# Patient Record
Sex: FEMALE | Race: WHITE | NOT HISPANIC OR LATINO | URBAN - METROPOLITAN AREA
[De-identification: names, ages, dates, MRNs, and addresses within clinical notes are randomized per-mention and may not be internally consistent; named-entity substitution may affect disease eponyms.]

---

## 2023-03-06 PROBLEM — M79.671 FOOT PAIN, BILATERAL: Status: ACTIVE | Noted: 2023-03-06

## 2023-03-06 PROBLEM — M99.06 SEGMENTAL AND SOMATIC DYSFUNCTION OF LOWER EXTREMITY: Status: ACTIVE | Noted: 2023-03-06

## 2023-03-06 PROBLEM — M25.551 RIGHT HIP PAIN: Status: ACTIVE | Noted: 2023-03-06

## 2023-03-06 PROBLEM — Q66.89 CLAW TOE: Status: ACTIVE | Noted: 2023-03-06

## 2023-03-06 PROBLEM — H52.10 MYOPIA: Status: ACTIVE | Noted: 2023-03-06

## 2023-03-06 PROBLEM — H52.10 MYOPIA WITH PRESBYOPIA: Status: ACTIVE | Noted: 2023-03-06

## 2023-03-06 PROBLEM — H52.209 ASTIGMATISM: Status: ACTIVE | Noted: 2023-03-06

## 2023-03-06 PROBLEM — S76.011A STRAIN OF FLEXOR MUSCLE OF RIGHT HIP: Status: ACTIVE | Noted: 2023-03-06

## 2023-03-06 PROBLEM — J45.901 ASTHMATIC BRONCHITIS WITH EXACERBATION (HHS-HCC): Status: ACTIVE | Noted: 2023-03-06

## 2023-03-06 PROBLEM — M77.42 METATARSALGIA OF LEFT FOOT: Status: ACTIVE | Noted: 2023-03-06

## 2023-03-06 PROBLEM — M06.9 RHEUMATOID ARTHRITIS (MULTI): Status: ACTIVE | Noted: 2023-03-06

## 2023-03-06 PROBLEM — M20.10 HALLUX VALGUS, ACQUIRED: Status: ACTIVE | Noted: 2023-03-06

## 2023-03-06 PROBLEM — M67.01 SHORT ACHILLES TENDON OF RIGHT LOWER EXTREMITY: Status: ACTIVE | Noted: 2023-03-06

## 2023-03-06 PROBLEM — M99.05 PELVIC REGION SOMATIC DYSFUNCTION: Status: ACTIVE | Noted: 2023-03-06

## 2023-03-06 PROBLEM — G47.9 SLEEP DISTURBANCE: Status: ACTIVE | Noted: 2023-03-06

## 2023-03-06 PROBLEM — M99.09 SEGMENTAL AND SOMATIC DYSFUNCTION: Status: ACTIVE | Noted: 2023-03-06
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
## 2023-03-06 PROBLEM — R00.2 PALPITATIONS: Status: ACTIVE | Noted: 2023-03-06

## 2023-03-06 PROBLEM — F32.A DEPRESSION: Status: ACTIVE | Noted: 2023-03-06

## 2023-03-06 PROBLEM — M54.50 LEFT-SIDED LOW BACK PAIN WITHOUT SCIATICA: Status: ACTIVE | Noted: 2023-03-06

## 2023-03-06 PROBLEM — H33.323 PERIPHERAL RETINAL HOLE OF BOTH EYES: Status: ACTIVE | Noted: 2023-03-06

## 2023-03-06 PROBLEM — M99.04 SEGMENTAL AND SOMATIC DYSFUNCTION OF SACRAL REGION: Status: ACTIVE | Noted: 2023-03-06

## 2023-03-06 PROBLEM — M99.02 SEGMENTAL AND SOMATIC DYSFUNCTION OF THORACIC REGION: Status: ACTIVE | Noted: 2023-03-06

## 2023-03-06 PROBLEM — M67.02 SHORT ACHILLES TENDON OF LEFT LOWER EXTREMITY: Status: RESOLVED | Noted: 2023-03-06 | Resolved: 2023-03-06

## 2023-03-06 PROBLEM — B35.3 TINEA PEDIS OF BOTH FEET: Status: ACTIVE | Noted: 2023-03-06

## 2023-03-06 PROBLEM — M25.552 LEFT HIP PAIN: Status: ACTIVE | Noted: 2023-03-06

## 2023-03-06 PROBLEM — M99.03 LUMBAR REGION SOMATIC DYSFUNCTION: Status: ACTIVE | Noted: 2023-03-06

## 2023-03-06 PROBLEM — H43.813 PVD (POSTERIOR VITREOUS DETACHMENT), BOTH EYES: Status: ACTIVE | Noted: 2023-03-06

## 2023-03-06 PROBLEM — R92.8 ABNORMAL FINDING ON MAMMOGRAPHY: Status: ACTIVE | Noted: 2023-03-06

## 2023-03-06 PROBLEM — R53.81 PHYSICAL DECONDITIONING: Status: ACTIVE | Noted: 2023-03-06

## 2023-03-06 PROBLEM — M77.41 METATARSALGIA OF RIGHT FOOT: Status: ACTIVE | Noted: 2023-03-06

## 2023-03-06 PROBLEM — H91.90 HEARING LOSS: Status: ACTIVE | Noted: 2023-03-06

## 2023-03-06 PROBLEM — M76.822 TIBIALIS POSTERIOR TENDONITIS, LEFT: Status: ACTIVE | Noted: 2023-03-06

## 2023-03-06 PROBLEM — R26.89 IMBALANCE: Status: ACTIVE | Noted: 2023-03-06

## 2023-03-06 PROBLEM — M79.672 FOOT PAIN, BILATERAL: Status: ACTIVE | Noted: 2023-03-06

## 2023-03-06 PROBLEM — N60.99 ATYPICAL DUCTAL HYPERPLASIA, BREAST: Status: ACTIVE | Noted: 2023-03-06

## 2023-03-06 PROBLEM — H52.4 MYOPIA WITH PRESBYOPIA: Status: ACTIVE | Noted: 2023-03-06

## 2023-03-06 PROBLEM — M76.821 TIBIALIS POSTERIOR TENDINITIS, RIGHT: Status: ACTIVE | Noted: 2023-03-06

## 2023-03-06 PROBLEM — R91.8 LUNG NODULES: Status: ACTIVE | Noted: 2023-03-06

## 2023-03-06 PROBLEM — H35.371 EPIRETINAL MEMBRANE (ERM) OF RIGHT EYE: Status: ACTIVE | Noted: 2023-03-06

## 2023-03-06 PROBLEM — M99.08 RIB CAGE REGION SOMATIC DYSFUNCTION: Status: ACTIVE | Noted: 2023-03-06

## 2023-03-06 PROBLEM — M20.009 FINGER DEFORMITY: Status: ACTIVE | Noted: 2023-03-06

## 2023-03-06 PROBLEM — M81.0 OSTEOPOROSIS: Status: ACTIVE | Noted: 2023-03-06

## 2023-03-06 PROBLEM — R79.89 LOW SERUM SODIUM: Status: ACTIVE | Noted: 2023-03-06

## 2023-03-06 PROBLEM — H25.11 AGE-RELATED NUCLEAR CATARACT, RIGHT: Status: ACTIVE | Noted: 2023-03-06

## 2023-03-06 PROBLEM — H25.813 COMBINED FORM OF AGE-RELATED CATARACT, BOTH EYES: Status: ACTIVE | Noted: 2023-03-06

## 2023-03-06 PROBLEM — D12.6 ADENOMATOUS COLON POLYP: Status: ACTIVE | Noted: 2023-03-06

## 2023-03-06 PROBLEM — I10 BENIGN HYPERTENSION: Status: ACTIVE | Noted: 2023-03-06

## 2023-03-06 PROBLEM — R29.898 MUSCULAR DECONDITIONING: Status: ACTIVE | Noted: 2023-03-06

## 2023-03-06 PROBLEM — H25.12 AGE-RELATED NUCLEAR CATARACT, LEFT: Status: ACTIVE | Noted: 2023-03-06

## 2023-03-06 RX ORDER — HYDROXYCHLOROQUINE SULFATE 200 MG/1
200 TABLET, FILM COATED ORAL
COMMUNITY
Start: 2014-01-15 | End: 2024-04-25 | Stop reason: SDUPTHER

## 2023-03-06 RX ORDER — FLUOXETINE HYDROCHLORIDE 20 MG/1
1 CAPSULE ORAL DAILY
COMMUNITY
Start: 2016-03-09 | End: 2023-04-07

## 2023-03-06 RX ORDER — CHOLECALCIFEROL (VITAMIN D3) 25 MCG
1 TABLET ORAL DAILY
COMMUNITY
Start: 2014-01-15

## 2023-03-06 RX ORDER — TRAZODONE HYDROCHLORIDE 50 MG/1
1 TABLET ORAL NIGHTLY PRN
COMMUNITY
Start: 2014-01-15 | End: 2023-04-07

## 2023-03-06 RX ORDER — METHOTREXATE 2.5 MG/1
TABLET ORAL
COMMUNITY
Start: 2022-05-26 | End: 2024-04-25 | Stop reason: SDUPTHER

## 2023-03-06 RX ORDER — CALCIUM CARBONATE 600 MG
1 TABLET ORAL DAILY
COMMUNITY
Start: 2015-04-29

## 2023-03-06 RX ORDER — ZOLEDRONIC ACID 5 MG/100ML
5 INJECTION, SOLUTION INTRAVENOUS
COMMUNITY
Start: 2022-06-24

## 2023-03-06 RX ORDER — FOLIC ACID 1 MG/1
1 TABLET ORAL DAILY
COMMUNITY
Start: 2014-01-15 | End: 2024-03-15

## 2023-03-13 ENCOUNTER — OFFICE VISIT (OUTPATIENT)
Dept: PRIMARY CARE | Facility: CLINIC | Age: 68
End: 2023-03-13
Payer: MEDICARE

## 2023-03-13 VITALS — HEIGHT: 68 IN | WEIGHT: 134.4 LBS | BODY MASS INDEX: 20.37 KG/M2

## 2023-03-13 DIAGNOSIS — I10 BENIGN HYPERTENSION: ICD-10-CM

## 2023-03-13 DIAGNOSIS — Q66.89 CLAW TOE: ICD-10-CM

## 2023-03-13 DIAGNOSIS — M79.672 FOOT PAIN, BILATERAL: Primary | ICD-10-CM

## 2023-03-13 DIAGNOSIS — M79.671 FOOT PAIN, BILATERAL: Primary | ICD-10-CM

## 2023-03-13 DIAGNOSIS — M05.741 RHEUMATOID ARTHRITIS INVOLVING BOTH HANDS WITH POSITIVE RHEUMATOID FACTOR (MULTI): ICD-10-CM

## 2023-03-13 DIAGNOSIS — M05.742 RHEUMATOID ARTHRITIS INVOLVING BOTH HANDS WITH POSITIVE RHEUMATOID FACTOR (MULTI): ICD-10-CM

## 2023-03-13 DIAGNOSIS — M85.88 OSTEOPENIA OF LUMBAR SPINE: ICD-10-CM

## 2023-03-13 PROCEDURE — 99213 OFFICE O/P EST LOW 20 MIN: CPT | Performed by: INTERNAL MEDICINE

## 2023-03-13 NOTE — PROGRESS NOTES
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       1. RA   -Plaquenil, MTX   -h/o subluxed finger  -FU rheum     2. HTN, h/o palpitatons   -Maxzide full tab daily in the past c/b dizziness   -stress test 12.2019 WNL     3. Osteoporosis   -Reclast since 2018 - last 7.22    4. MDD   -Prozac 20   -Trazo 50     5. Leg wuond in 2017, chronic venous stasis, mild     6. Atypical ductal hyperplasia     7. Bunions, foot pain - saw podiatry (Robbin) 4/22, given orthotics     8. Pulm nodule, never smoker   -last CT chest 2015     10. LCIS s/p rxn     #HM   [ ]due screen labs   -dexa 9.2021   -mammo 12.16.22  -cscope 7.2021 ([facility]) - 7 years  -cac zero in 2013   -shingrix x2   -tdap 2016           3/23/2022     1:14 PM 3/30/2022     2:26 PM 4/20/2022     9:37 AM 7/21/2022     8:09 AM 7/21/2022     8:45 AM 9/20/2022     3:09 PM 3/13/2023     9:08 AM   Vitals   Systolic 163  153 134  117    Diastolic 97  67 81  79    Heart Rate 76  85 76  87    Temp 35.7 °C (96.3 °F)  36.1 °C (96.9 °F) 36.2 °C (97.1 °F) 36.2 °C (97.2 °F)     Resp    18      Height (in)  1.72 m (5' 7.72\") 1.733 m (5' 8.23\")   1.727 m (5' 8\") 1.727 m (5' 8\")   Weight (lb)  128 128.97 130.84  129 134.4   BMI  19.63 kg/m2 19.48 kg/m2 19.76 kg/m2  19.61 kg/m2 20.44 kg/m2   BSA (m2)  1.67 m2 1.68 m2 1.69 m2  1.68 m2 1.71 m2   Visit Report       Report    /80 today   HR 67     Gen Aox3 NAD well appearing   HEENT mmm pharynx clear no cervical LAD   Eyes sclerae clear   CV rrr nl s1/2 no m/r/g  Pulm CTAB no adventitious sounds   Ext warm dry no edema 2+ DP pulse       A/P   -screening labs today   -RTO 1 year  "

## 2023-03-24 ENCOUNTER — LAB (OUTPATIENT)
Dept: LAB | Facility: LAB | Age: 68
End: 2023-03-24
Payer: MEDICARE

## 2023-03-24 DIAGNOSIS — M05.741 RHEUMATOID ARTHRITIS INVOLVING BOTH HANDS WITH POSITIVE RHEUMATOID FACTOR (MULTI): ICD-10-CM

## 2023-03-24 DIAGNOSIS — I10 BENIGN HYPERTENSION: ICD-10-CM

## 2023-03-24 DIAGNOSIS — M85.88 OSTEOPENIA OF LUMBAR SPINE: ICD-10-CM

## 2023-03-24 DIAGNOSIS — M05.742 RHEUMATOID ARTHRITIS INVOLVING BOTH HANDS WITH POSITIVE RHEUMATOID FACTOR (MULTI): ICD-10-CM

## 2023-03-24 LAB
BASOPHILS (10*3/UL) IN BLOOD BY AUTOMATED COUNT: 0.02 X10E9/L (ref 0–0.1)
BASOPHILS/100 LEUKOCYTES IN BLOOD BY AUTOMATED COUNT: 0.2 % (ref 0–2)
EOSINOPHILS (10*3/UL) IN BLOOD BY AUTOMATED COUNT: 0.12 X10E9/L (ref 0–0.7)
EOSINOPHILS/100 LEUKOCYTES IN BLOOD BY AUTOMATED COUNT: 1.5 % (ref 0–6)
ERYTHROCYTE DISTRIBUTION WIDTH (RATIO) BY AUTOMATED COUNT: 12.9 % (ref 11.5–14.5)
ERYTHROCYTE MEAN CORPUSCULAR HEMOGLOBIN CONCENTRATION (G/DL) BY AUTOMATED: 33.2 G/DL (ref 32–36)
ERYTHROCYTE MEAN CORPUSCULAR VOLUME (FL) BY AUTOMATED COUNT: 99 FL (ref 80–100)
ERYTHROCYTES (10*6/UL) IN BLOOD BY AUTOMATED COUNT: 3.85 X10E12/L (ref 4–5.2)
HEMATOCRIT (%) IN BLOOD BY AUTOMATED COUNT: 38 % (ref 36–46)
HEMOGLOBIN (G/DL) IN BLOOD: 12.6 G/DL (ref 12–16)
IMMATURE GRANULOCYTES/100 LEUKOCYTES IN BLOOD BY AUTOMATED COUNT: 0.2 % (ref 0–0.9)
LEUKOCYTES (10*3/UL) IN BLOOD BY AUTOMATED COUNT: 8.2 X10E9/L (ref 4.4–11.3)
LYMPHOCYTES (10*3/UL) IN BLOOD BY AUTOMATED COUNT: 2.32 X10E9/L (ref 1.2–4.8)
LYMPHOCYTES/100 LEUKOCYTES IN BLOOD BY AUTOMATED COUNT: 28.4 % (ref 13–44)
MONOCYTES (10*3/UL) IN BLOOD BY AUTOMATED COUNT: 0.46 X10E9/L (ref 0.1–1)
MONOCYTES/100 LEUKOCYTES IN BLOOD BY AUTOMATED COUNT: 5.6 % (ref 2–10)
NEUTROPHILS (10*3/UL) IN BLOOD BY AUTOMATED COUNT: 5.24 X10E9/L (ref 1.2–7.7)
NEUTROPHILS/100 LEUKOCYTES IN BLOOD BY AUTOMATED COUNT: 64.1 % (ref 40–80)
NRBC (PER 100 WBCS) BY AUTOMATED COUNT: 0 /100 WBC (ref 0–0)
PLATELETS (10*3/UL) IN BLOOD AUTOMATED COUNT: 242 X10E9/L (ref 150–450)

## 2023-03-24 PROCEDURE — 36415 COLL VENOUS BLD VENIPUNCTURE: CPT

## 2023-03-24 PROCEDURE — 84443 ASSAY THYROID STIM HORMONE: CPT

## 2023-03-24 PROCEDURE — 80061 LIPID PANEL: CPT

## 2023-03-24 PROCEDURE — 85025 COMPLETE CBC W/AUTO DIFF WBC: CPT

## 2023-03-24 PROCEDURE — 82306 VITAMIN D 25 HYDROXY: CPT

## 2023-03-24 PROCEDURE — 80053 COMPREHEN METABOLIC PANEL: CPT

## 2023-03-25 LAB
ALANINE AMINOTRANSFERASE (SGPT) (U/L) IN SER/PLAS: 23 U/L (ref 7–45)
ALBUMIN (G/DL) IN SER/PLAS: 4.4 G/DL (ref 3.4–5)
ALKALINE PHOSPHATASE (U/L) IN SER/PLAS: 52 U/L (ref 33–136)
ANION GAP IN SER/PLAS: 13 MMOL/L (ref 10–20)
ASPARTATE AMINOTRANSFERASE (SGOT) (U/L) IN SER/PLAS: 27 U/L (ref 9–39)
BILIRUBIN TOTAL (MG/DL) IN SER/PLAS: 0.6 MG/DL (ref 0–1.2)
CALCIDIOL (25 OH VITAMIN D3) (NG/ML) IN SER/PLAS: 64 NG/ML
CALCIUM (MG/DL) IN SER/PLAS: 9.7 MG/DL (ref 8.6–10.6)
CARBON DIOXIDE, TOTAL (MMOL/L) IN SER/PLAS: 29 MMOL/L (ref 21–32)
CHLORIDE (MMOL/L) IN SER/PLAS: 97 MMOL/L (ref 98–107)
CHOLESTEROL (MG/DL) IN SER/PLAS: 204 MG/DL (ref 0–199)
CHOLESTEROL IN HDL (MG/DL) IN SER/PLAS: 80.7 MG/DL
CHOLESTEROL/HDL RATIO: 2.5
CREATININE (MG/DL) IN SER/PLAS: 0.73 MG/DL (ref 0.5–1.05)
GFR FEMALE: 90 ML/MIN/1.73M2
GLUCOSE (MG/DL) IN SER/PLAS: 93 MG/DL (ref 74–99)
LDL: 98 MG/DL (ref 0–99)
POTASSIUM (MMOL/L) IN SER/PLAS: 4.7 MMOL/L (ref 3.5–5.3)
PROTEIN TOTAL: 6.7 G/DL (ref 6.4–8.2)
SODIUM (MMOL/L) IN SER/PLAS: 134 MMOL/L (ref 136–145)
THYROTROPIN (MIU/L) IN SER/PLAS BY DETECTION LIMIT <= 0.05 MIU/L: 1.52 MIU/L (ref 0.44–3.98)
TRIGLYCERIDE (MG/DL) IN SER/PLAS: 126 MG/DL (ref 0–149)
UREA NITROGEN (MG/DL) IN SER/PLAS: 13 MG/DL (ref 6–23)
VLDL: 25 MG/DL (ref 0–40)

## 2023-04-07 DIAGNOSIS — G47.9 SLEEP DISTURBANCE: Primary | ICD-10-CM

## 2023-04-07 RX ORDER — FLUOXETINE HYDROCHLORIDE 20 MG/1
CAPSULE ORAL
Qty: 90 CAPSULE | Refills: 1 | Status: SHIPPED | OUTPATIENT
Start: 2023-04-07 | End: 2023-10-04

## 2023-04-07 RX ORDER — TRAZODONE HYDROCHLORIDE 50 MG/1
TABLET ORAL
Qty: 90 TABLET | Refills: 1 | Status: SHIPPED | OUTPATIENT
Start: 2023-04-07 | End: 2023-10-04

## 2023-09-26 ENCOUNTER — APPOINTMENT (OUTPATIENT)
Dept: PRIMARY CARE | Facility: CLINIC | Age: 68
End: 2023-09-26
Payer: MEDICARE

## 2023-09-27 ENCOUNTER — APPOINTMENT (OUTPATIENT)
Dept: PRIMARY CARE | Facility: CLINIC | Age: 68
End: 2023-09-27
Payer: MEDICARE

## 2023-10-04 DIAGNOSIS — G47.9 SLEEP DISTURBANCE: ICD-10-CM

## 2023-10-04 RX ORDER — TRAZODONE HYDROCHLORIDE 50 MG/1
TABLET ORAL
Qty: 90 TABLET | Refills: 3 | Status: SHIPPED | OUTPATIENT
Start: 2023-10-04 | End: 2024-02-16 | Stop reason: SDUPTHER

## 2023-10-04 RX ORDER — FLUOXETINE HYDROCHLORIDE 20 MG/1
CAPSULE ORAL
Qty: 90 CAPSULE | Refills: 3 | Status: SHIPPED | OUTPATIENT
Start: 2023-10-04 | End: 2024-02-16 | Stop reason: SDUPTHER

## 2023-10-18 ENCOUNTER — OFFICE VISIT (OUTPATIENT)
Dept: PRIMARY CARE | Facility: CLINIC | Age: 68
End: 2023-10-18
Payer: MEDICARE

## 2023-10-18 VITALS
DIASTOLIC BLOOD PRESSURE: 81 MMHG | SYSTOLIC BLOOD PRESSURE: 141 MMHG | WEIGHT: 133.5 LBS | HEART RATE: 65 BPM | BODY MASS INDEX: 20.3 KG/M2

## 2023-10-18 DIAGNOSIS — Z78.0 ASYMPTOMATIC POSTMENOPAUSAL STATE: ICD-10-CM

## 2023-10-18 DIAGNOSIS — Z12.31 BREAST CANCER SCREENING BY MAMMOGRAM: ICD-10-CM

## 2023-10-18 DIAGNOSIS — Z12.39 ENCOUNTER FOR SCREENING FOR MALIGNANT NEOPLASM OF BREAST, UNSPECIFIED SCREENING MODALITY: Primary | ICD-10-CM

## 2023-10-18 DIAGNOSIS — I10 BENIGN ESSENTIAL HYPERTENSION: ICD-10-CM

## 2023-10-18 PROCEDURE — 1170F FXNL STATUS ASSESSED: CPT | Performed by: INTERNAL MEDICINE

## 2023-10-18 PROCEDURE — 99214 OFFICE O/P EST MOD 30 MIN: CPT | Performed by: INTERNAL MEDICINE

## 2023-10-18 PROCEDURE — 3079F DIAST BP 80-89 MM HG: CPT | Performed by: INTERNAL MEDICINE

## 2023-10-18 PROCEDURE — G0439 PPPS, SUBSEQ VISIT: HCPCS | Performed by: INTERNAL MEDICINE

## 2023-10-18 PROCEDURE — 1126F AMNT PAIN NOTED NONE PRSNT: CPT | Performed by: INTERNAL MEDICINE

## 2023-10-18 PROCEDURE — 3077F SYST BP >= 140 MM HG: CPT | Performed by: INTERNAL MEDICINE

## 2023-10-18 RX ORDER — LISINOPRIL 10 MG/1
10 TABLET ORAL DAILY
Qty: 30 TABLET | Refills: 0 | Status: SHIPPED
Start: 2023-10-18 | End: 2024-02-16 | Stop reason: DRUGHIGH

## 2023-10-18 ASSESSMENT — ACTIVITIES OF DAILY LIVING (ADL)
TAKING_MEDICATION: INDEPENDENT
GROCERY_SHOPPING: INDEPENDENT
DOING_HOUSEWORK: INDEPENDENT
DRESSING: INDEPENDENT
BATHING: INDEPENDENT
MANAGING_FINANCES: INDEPENDENT

## 2023-10-18 ASSESSMENT — ENCOUNTER SYMPTOMS
DEPRESSION: 0
OCCASIONAL FEELINGS OF UNSTEADINESS: 0
LOSS OF SENSATION IN FEET: 0

## 2023-10-18 ASSESSMENT — PATIENT HEALTH QUESTIONNAIRE - PHQ9
2. FEELING DOWN, DEPRESSED OR HOPELESS: NOT AT ALL
1. LITTLE INTEREST OR PLEASURE IN DOING THINGS: NOT AT ALL
SUM OF ALL RESPONSES TO PHQ9 QUESTIONS 1 AND 2: 0

## 2023-10-18 NOTE — PATIENT INSTRUCTIONS
Sunita,     Call [phone] to schedule mammogram 12-16-23 or later and a bone density anytime!   Start lisin 10 1/2 tab at bedtime can increase to full - let me know home Bps in a few weeks and go to any  lab in 2 weeks or so for repeat BMP.   Full labs due in 3.2024 and I will order.  Message me via [employer] anytime.     CL

## 2023-10-18 NOTE — PROGRESS NOTES
Sunita presents for [facility] wellness.          1. RA   -Plaquenil, MTX   -h/o subluxed finger  -FU rheum      2. HTN, h/o palpitatons - off meds with h/o Maxzide only in the past c/b dizziness   -some elev home Bps      3. Osteoporosis   -Reclast since 2018 - last 7.22     4. MDD   -Prozac 20   -Trazo 50      5. Leg wuond in 2017, chronic venous stasis, mild      6. Atypical ductal hyperplasia      7. Bunions, foot pain - saw podiatry (Robbin) 4/22, given orthotics      8. Pulm nodule, never smoker   -last CT chest 2015      10. LCIS s/p rxn      #HM   -screen labs 3.23   -dexa 9.2021   -mammo 12.16.22  -cscope 7.2021 (Jai) - 7 years  -cac zero in 2013   -shingrix x2   -tdap 2016       Gen Aox3 NAD well appearing   HEENT mmm pharynx clear no cervical LAD   Eyes sclerae clear   CV rrr nl s1/2 no m/r/g  Pulm CTAB no adventitious sounds   Ext warm dry no edema 2+ DP pulse   Breast no mass         A/P   Sunita presents for wellness visit with new dx of HTN.     -start lisin 10 1/2 tab at bedtime can increase to full tab   -repeat BMP in 2 wks   -full screen labs due 3.2024

## 2023-12-26 ENCOUNTER — ANCILLARY PROCEDURE (OUTPATIENT)
Dept: RADIOLOGY | Facility: CLINIC | Age: 68
End: 2023-12-26
Payer: MEDICARE

## 2023-12-26 VITALS — BODY MASS INDEX: 20.25 KG/M2 | WEIGHT: 133.6 LBS | HEIGHT: 68 IN

## 2023-12-26 DIAGNOSIS — Z12.31 BREAST CANCER SCREENING BY MAMMOGRAM: ICD-10-CM

## 2023-12-26 PROCEDURE — 77067 SCR MAMMO BI INCL CAD: CPT | Performed by: RADIOLOGY

## 2023-12-26 PROCEDURE — 77067 SCR MAMMO BI INCL CAD: CPT

## 2023-12-26 PROCEDURE — 77063 BREAST TOMOSYNTHESIS BI: CPT | Performed by: RADIOLOGY

## 2024-02-16 ENCOUNTER — OFFICE VISIT (OUTPATIENT)
Dept: PRIMARY CARE | Facility: CLINIC | Age: 69
End: 2024-02-16
Payer: MEDICARE

## 2024-02-16 VITALS
DIASTOLIC BLOOD PRESSURE: 85 MMHG | WEIGHT: 138 LBS | BODY MASS INDEX: 20.99 KG/M2 | SYSTOLIC BLOOD PRESSURE: 147 MMHG | RESPIRATION RATE: 18 BRPM | HEART RATE: 75 BPM | OXYGEN SATURATION: 98 %

## 2024-02-16 DIAGNOSIS — Z00.00 HEALTH CARE MAINTENANCE: ICD-10-CM

## 2024-02-16 DIAGNOSIS — G47.9 SLEEP DISTURBANCE: ICD-10-CM

## 2024-02-16 DIAGNOSIS — M81.0 OSTEOPOROSIS, UNSPECIFIED OSTEOPOROSIS TYPE, UNSPECIFIED PATHOLOGICAL FRACTURE PRESENCE: Primary | ICD-10-CM

## 2024-02-16 DIAGNOSIS — M06.09 RHEUMATOID ARTHRITIS OF MULTIPLE SITES WITH NEGATIVE RHEUMATOID FACTOR (MULTI): ICD-10-CM

## 2024-02-16 DIAGNOSIS — M25.519 SHOULDER PAIN, UNSPECIFIED CHRONICITY, UNSPECIFIED LATERALITY: ICD-10-CM

## 2024-02-16 DIAGNOSIS — I10 PRIMARY HYPERTENSION: ICD-10-CM

## 2024-02-16 DIAGNOSIS — M54.2 NECK PAIN: ICD-10-CM

## 2024-02-16 DIAGNOSIS — E78.5 HYPERLIPIDEMIA, UNSPECIFIED HYPERLIPIDEMIA TYPE: ICD-10-CM

## 2024-02-16 PROBLEM — R92.333 HETEROGENEOUSLY DENSE TISSUE OF BOTH BREASTS ON MAMMOGRAPHY: Status: ACTIVE | Noted: 2024-02-16

## 2024-02-16 PROCEDURE — 1126F AMNT PAIN NOTED NONE PRSNT: CPT | Performed by: SPECIALIST

## 2024-02-16 PROCEDURE — 1160F RVW MEDS BY RX/DR IN RCRD: CPT | Performed by: SPECIALIST

## 2024-02-16 PROCEDURE — 3079F DIAST BP 80-89 MM HG: CPT | Performed by: SPECIALIST

## 2024-02-16 PROCEDURE — 1159F MED LIST DOCD IN RCRD: CPT | Performed by: SPECIALIST

## 2024-02-16 PROCEDURE — 1036F TOBACCO NON-USER: CPT | Performed by: SPECIALIST

## 2024-02-16 PROCEDURE — 3077F SYST BP >= 140 MM HG: CPT | Performed by: SPECIALIST

## 2024-02-16 PROCEDURE — 99214 OFFICE O/P EST MOD 30 MIN: CPT | Performed by: SPECIALIST

## 2024-02-16 RX ORDER — LISINOPRIL 2.5 MG/1
2.5 TABLET ORAL DAILY
Qty: 90 TABLET | Refills: 0 | Status: SHIPPED | OUTPATIENT
Start: 2024-02-16 | End: 2024-05-16 | Stop reason: SINTOL

## 2024-02-16 RX ORDER — TRAZODONE HYDROCHLORIDE 50 MG/1
TABLET ORAL
Qty: 90 TABLET | Refills: 3 | Status: SHIPPED | OUTPATIENT
Start: 2024-02-16

## 2024-02-16 RX ORDER — FLUOXETINE HYDROCHLORIDE 20 MG/1
20 CAPSULE ORAL DAILY
Qty: 90 CAPSULE | Refills: 3 | Status: SHIPPED | OUTPATIENT
Start: 2024-02-16

## 2024-02-16 NOTE — PROGRESS NOTES
"Subjective   Patient ID: Peyton Alberto \"Sunita\" is a 68 y.o. female who presents for Follow-up.  HPI    69 yo female Pmhx sig for Seronegative RA, Htn, Dense Breast Tissue, Osteoporosis, Pulm Nodule, MDD presents today for BP, and a few other things    Needs refills  Referral for PT for shoulder or neck pain    Does strength training, exercise helps mood.      Not taking lisinopril   Brought home BP cuff typically less than 140/90  Has been very strict on sodium and BP improved    Prior CT Cardiac score    Allergies   Allergen Reactions    Fluorescein-Benoxinate Other     keratitis    Sulfa (Sulfonamide Antibiotics) Rash      Current Outpatient Medications   Medication Instructions    calcium carbonate 600 mg calcium (1,500 mg) tablet 1 tablet, oral, Daily    cholecalciferol (Vitamin D-3) 25 MCG (1000 UT) tablet 1 tablet, oral, Daily    FLUoxetine (PROZAC) 20 mg, oral, Daily    folic acid (Folvite) 1 mg tablet 1 tablet, oral, Daily    hydroxychloroquine (PLAQUENIL) 200 mg, oral, TWICE A DAY 5 DAYS A WEEK    lisinopril 2.5 mg, oral, Daily    methotrexate (Trexall) 2.5 mg tablet oral, TAKE 8 TABLETS ONCE A WEEK    traZODone (Desyrel) 50 mg tablet TAKE 1 TABLET AT BEDTIME AS NEEDED    zoledronic acid (Reclast) 5 mg/100 mL piggyback 5 mg, intravenous, OVER 15 MINUTES AS DIRECTED.      Review of Systems  Constitutional  No fatigue, no fevers, no chills, no unintentional weight loss  Mood:  Good, no suicidal ideation, sleeping okay  HEENT:  No headaches, no dizziness, current eye exams  Cardiovascular:  No chest pain, no palpitations,   Respiratory:  No cough, No KATHLEEN  GI:   No reflux, no abdominal pain, no nausea, no vomiting, no changes in bowel  MSK:  One fall hiking, joint pain well controlled    Physical Exam  /85   Pulse 75   Resp 18   Wt 62.6 kg (138 lb)   SpO2 98%   BMI 20.99 kg/m²   138/76 Right small adult cuff  BP her machine right arm seated 139/91  General:    Well-appearing  F in no acute " distress, well nourished, well hydrated  Head:  Normocephalic, atraumatic  Skin:          Warm dry,   Eyes:  Anicteric sclera, pupils equal,   Oral:      Not examined due to pandemic  Neck:   Supple  Cor:      Regular rate, normal S1, S2, no murmurs appreciated, no S3, no S4   Lungs:   Clear to auscultation b/l, no wheezes, no rhonchi, no crackles, no accessory respiratory muscle use    Assessment/Plan   Problem List Items Addressed This Visit       Osteoporosis - Primary     Management per Rheum  On Reclast         Rheumatoid arthritis (CMS/HCC)     Seronegative  On methotrexate and Plaquenil  Eye exams current   Management per Rheum         Relevant Orders    Vitamin B12    Sleep disturbance    Relevant Medications    FLUoxetine (PROzac) 20 mg capsule    traZODone (Desyrel) 50 mg tablet    Primary hypertension    Relevant Medications    lisinopril 2.5 mg tablet    Other Relevant Orders    Comprehensive Metabolic Panel    Health care maintenance     Previously received Covid vaccine 9/25/2023  Previously received Influenza vaccine 10/12/2023  Previously received Tdap 5/16/2016  Previously received PPSV23 4/26/2021  Previously received Prevnar 13  Recommend RSV vaccine  Colonoscopy 7/7/2021 repeat 7 yrs  Mammogram 12/26/2023 repeat 1 yr         Hyperlipidemia     LDL 98, ASCVD 12.1%  Prior CT Cardiac Score 2013 of 0  Discussed statin therapy  Labs ordered fx lipids and Lp(a)         Relevant Orders    Comprehensive Metabolic Panel    Lipid Panel    Lipoprotein A (LPA)    Shoulder pain    Relevant Orders    Referral to Physical Therapy    Neck pain    Relevant Orders    Referral to Physical Therapy        Glory Cordon DO

## 2024-02-22 PROBLEM — M25.519 SHOULDER PAIN: Status: ACTIVE | Noted: 2024-02-22

## 2024-02-22 PROBLEM — Z00.00 HEALTH CARE MAINTENANCE: Status: ACTIVE | Noted: 2024-02-22

## 2024-02-22 PROBLEM — I10 BENIGN HYPERTENSION: Status: RESOLVED | Noted: 2023-03-06 | Resolved: 2024-02-22

## 2024-02-22 PROBLEM — I10 PRIMARY HYPERTENSION: Status: ACTIVE | Noted: 2024-02-22

## 2024-02-22 PROBLEM — M54.2 NECK PAIN: Status: ACTIVE | Noted: 2024-02-22

## 2024-02-22 PROBLEM — E78.5 HYPERLIPIDEMIA: Status: ACTIVE | Noted: 2024-02-22

## 2024-02-22 NOTE — ASSESSMENT & PLAN NOTE
LDL 98, ASCVD 12.1%  Prior CT Cardiac Score 2013 of 0  Discussed statin therapy  Labs ordered fx lipids and Lp(a) CMP

## 2024-02-22 NOTE — ASSESSMENT & PLAN NOTE
BP today 138/76  Goal less than 130/80 ideally  Start low dose lisinopril 2.5 mg once daily  Continue home BP monitoring  Labs ordered

## 2024-02-22 NOTE — ASSESSMENT & PLAN NOTE
Previously received Covid vaccine 9/25/2023  Previously received Influenza vaccine 10/12/2023  Previously received Tdap 5/16/2016  Previously received PPSV23 4/26/2021  Previously received Prevnar 13  Recommend RSV vaccine  Colonoscopy 7/7/2021 repeat 7 yrs  Mammogram 12/26/2023 repeat 1 yr

## 2024-03-15 DIAGNOSIS — M05.79 RHEUMATOID ARTHRITIS INVOLVING MULTIPLE SITES WITH POSITIVE RHEUMATOID FACTOR (MULTI): Primary | ICD-10-CM

## 2024-03-15 RX ORDER — FOLIC ACID 1 MG/1
1 TABLET ORAL DAILY
Qty: 90 TABLET | Refills: 3 | Status: SHIPPED | OUTPATIENT
Start: 2024-03-15 | End: 2024-04-25 | Stop reason: SDUPTHER

## 2024-03-25 ENCOUNTER — HOSPITAL ENCOUNTER (OUTPATIENT)
Dept: RADIOLOGY | Facility: CLINIC | Age: 69
Discharge: HOME | End: 2024-03-25
Payer: MEDICARE

## 2024-03-25 DIAGNOSIS — Z12.39 ENCOUNTER FOR SCREENING FOR MALIGNANT NEOPLASM OF BREAST, UNSPECIFIED SCREENING MODALITY: ICD-10-CM

## 2024-03-25 DIAGNOSIS — Z78.0 ASYMPTOMATIC POSTMENOPAUSAL STATE: ICD-10-CM

## 2024-03-25 PROCEDURE — 77080 DXA BONE DENSITY AXIAL: CPT | Performed by: RADIOLOGY

## 2024-03-25 PROCEDURE — 77080 DXA BONE DENSITY AXIAL: CPT

## 2024-04-21 NOTE — PROGRESS NOTES
"Subjective   Patient ID: Peyton Alberto \"Sunita\" is a 68 y.o. female who presents for No chief complaint on file..    HPI    seronegative, erosive rheumatoid arthritis affecting primarily her hands and feet  Osteoporosis    ACTONEL from 3789-7167  Reclast X 3 last yeaar    ON MTX    VIT D 67  CTX 2021 201  CTX 2022 292    DEXA 3-2024    SPINE L1-L4  Bone Mineral Density: 0.771  T-Score -3.4  Z-Score -1.8      Bone Mineral Density change vs baseline:  Not reported  Bone Mineral Density change vs previous: -2.8%      LEFT FEMUR -TOTAL  Bone Mineral Density: 0.638  T-Score -2.9   Z-Score  -1.6      Bone Mineral Density change vs baseline: Not reported  Bone Mineral Density change vs previous: -1.7%.      LEFT FEMUR -NECK  Bone Mineral Density: 0.629  T-Score -2.9  Z-Score -1.3      Bone Mineral Density change vs baseline: Not reported  Bone Mineral Density change vs previous: Not reported      FRAX    10-year Fracture Risk:  Major Osteoporotic Fracture  20.4  Hip Fracture                        7.1    PAIN:  SWELLING:   AM GEL:  SIDE EFFECTS OF MED:    LAST LAB  Lab Results   Component Value Date    SEDRATE <1 03/23/2022    CRP <0.10 03/23/2022    TSH 1.52 03/24/2023   CBC CMP normal 2024 reviewed      PHYSICAL EXAM  NODES   HEART  LUNGS  ABDOMEN   VASCULAR  NEURO   SKIN  JOINTS   There is currently no information documented on the homunculus. Go to the Rheumatology activity and complete the homunculus joint exam.   Assessment/Plan   Diagnoses and all orders for this visit:  Osteoporosis, unspecified osteoporosis type, unspecified pathological fracture presence  Rheumatoid arthritis of multiple sites with negative rheumatoid factor (Multi)    Yearly follow-up for both rheumatoid arthritis and osteoporosis    No major changes in the activity of rheumatoid arthritis    No significant falls or fractures excetra regarding osteoporosis  She had 3 consecutive years of Reclast ending in 2023  Review of DEXA scan " sequentially and compared to 2022 revealed slight worsening of spine femur and femoral neck but at most less than 2%  Her FRAX score remains elevated    At this time she will continue with her methotrexate hydroxychloroquine    At this point I would not put her back on Reclast or other osteoporotic disease modifying drugs    She should check a serum CTX and we can see if there is a significant trend upward on this value and decide.  I did discuss a year of monthly Evenity injections with her but made no decisions at this time.

## 2024-04-22 ENCOUNTER — OFFICE VISIT (OUTPATIENT)
Dept: RHEUMATOLOGY | Facility: CLINIC | Age: 69
End: 2024-04-22
Payer: MEDICARE

## 2024-04-22 ENCOUNTER — OFFICE VISIT (OUTPATIENT)
Dept: PRIMARY CARE | Facility: CLINIC | Age: 69
End: 2024-04-22
Payer: MEDICARE

## 2024-04-22 VITALS
DIASTOLIC BLOOD PRESSURE: 90 MMHG | WEIGHT: 136 LBS | BODY MASS INDEX: 20.68 KG/M2 | HEART RATE: 84 BPM | SYSTOLIC BLOOD PRESSURE: 163 MMHG

## 2024-04-22 VITALS
SYSTOLIC BLOOD PRESSURE: 152 MMHG | OXYGEN SATURATION: 98 % | DIASTOLIC BLOOD PRESSURE: 82 MMHG | RESPIRATION RATE: 16 BRPM | BODY MASS INDEX: 20.93 KG/M2 | HEART RATE: 83 BPM | WEIGHT: 137.6 LBS

## 2024-04-22 DIAGNOSIS — Z71.89 CARDIAC RISK COUNSELING: ICD-10-CM

## 2024-04-22 DIAGNOSIS — M81.0 OSTEOPOROSIS, UNSPECIFIED OSTEOPOROSIS TYPE, UNSPECIFIED PATHOLOGICAL FRACTURE PRESENCE: Primary | ICD-10-CM

## 2024-04-22 DIAGNOSIS — M06.09 RHEUMATOID ARTHRITIS OF MULTIPLE SITES WITH NEGATIVE RHEUMATOID FACTOR (MULTI): ICD-10-CM

## 2024-04-22 DIAGNOSIS — I10 PRIMARY HYPERTENSION: Primary | ICD-10-CM

## 2024-04-22 PROCEDURE — 1160F RVW MEDS BY RX/DR IN RCRD: CPT | Performed by: SPECIALIST

## 2024-04-22 PROCEDURE — 3077F SYST BP >= 140 MM HG: CPT | Performed by: SPECIALIST

## 2024-04-22 PROCEDURE — 99214 OFFICE O/P EST MOD 30 MIN: CPT | Performed by: INTERNAL MEDICINE

## 2024-04-22 PROCEDURE — 3077F SYST BP >= 140 MM HG: CPT | Performed by: INTERNAL MEDICINE

## 2024-04-22 PROCEDURE — 1159F MED LIST DOCD IN RCRD: CPT | Performed by: SPECIALIST

## 2024-04-22 PROCEDURE — 1160F RVW MEDS BY RX/DR IN RCRD: CPT | Performed by: INTERNAL MEDICINE

## 2024-04-22 PROCEDURE — 3079F DIAST BP 80-89 MM HG: CPT | Performed by: SPECIALIST

## 2024-04-22 PROCEDURE — 99213 OFFICE O/P EST LOW 20 MIN: CPT | Performed by: SPECIALIST

## 2024-04-22 PROCEDURE — 3080F DIAST BP >= 90 MM HG: CPT | Performed by: INTERNAL MEDICINE

## 2024-04-22 PROCEDURE — 1159F MED LIST DOCD IN RCRD: CPT | Performed by: INTERNAL MEDICINE

## 2024-04-22 NOTE — PROGRESS NOTES
"Subjective   Patient ID: Peyton Alberto \"Sunita\" is a 68 y.o. female who presents for Follow-up.  HPI    69 yo female Pmhx sig for Seronegative RA, Htn, Dense Breast Tissue, Osteoporosis, Pulm Nodule, MDD presents for follow-up    I apologized for running late    Saw Rhuem this morning   Not sure about further Reclast    Life is good   BP elevated today  Noted Cough, unclear if from allergies or from lisinopril  Discussed considering changing to losartan next visit if cough persists  Will be going to dinner tonight and needs to finish cake when she gets home     To get bmp cbc     Doing strength training  Bird watching    Allergies   Allergen Reactions    Fluorescein-Benoxinate Other     keratitis    Sulfa (Sulfonamide Antibiotics) Rash      Current Outpatient Medications   Medication Instructions    calcium carbonate 600 mg calcium (1,500 mg) tablet 1 tablet, oral, Daily    cholecalciferol (Vitamin D-3) 25 MCG (1000 UT) tablet 1 tablet, oral, Daily    FLUoxetine (PROZAC) 20 mg, oral, Daily    folic acid (FOLVITE) 1 mg, oral, Daily    hydroxychloroquine (PLAQUENIL) 200 mg, oral, TWICE A DAY 5 DAYS A WEEK    lisinopril 2.5 mg, oral, Daily    methotrexate (Trexall) 2.5 mg tablet oral, TAKE 8 TABLETS ONCE A WEEK    traZODone (Desyrel) 50 mg tablet TAKE 1 TABLET AT BEDTIME AS NEEDED    zoledronic acid (Reclast) 5 mg/100 mL piggyback 5 mg, intravenous, OVER 15 MINUTES AS DIRECTED.        Review of Systems  Constitutional  No fatigue, no fevers, weight stable  HEENT:  No headaches, no dizziness, eye exams current  Cardiovascular:  No chest pain, no palpitations,   Respiratory:  Positive (attributes to lisinopril) cough, No shortness of breath    Physical Exam  BP (!) 162/96   Pulse 83   Resp 16   Wt 62.4 kg (137 lb 9.6 oz)   SpO2 98%   BMI 20.93 kg/m²   152/82 right arm seated after minute meditation  General:    Well-appearing  F in no acute distress, well nourished, well hydrated  Head:  Normocephalic, " atraumatic  Skin:          Warm dry,   Eyes:  Anicteric sclera, pupils equal,   Oral:      Not examined due to pandemic  Neck:   Supple,   Cor:      Regular rate, normal S1, S2, no murmurs appreciated, no S3, no S4   Lungs:   Clear to auscultation b/l, no wheezes, no rhonchi, no crackles, no accessory respiratory muscle use    Assessment/Plan   Problem List Items Addressed This Visit    None    Hypertension  -BP elevated in office  -Recommend home BP monitoring (reports home /79 127/83)   -On lisinopril 2.5 mg daily  -Notes cough, consider change to losartan next visit if cough persists  -Maintain daily sodium to less than 1.5 grams  -Exercising more but limited time   -If home BP above 130/80 will increase lisinopril to 5 mg daily    Cardiac Risk counseling  -LDL 98, ASCVD 12.1%  -Prior CT Cardiac Score 2013 of 0  -Discussed and recommend Lp(a)  -Discussed independent CV risk factor  -Recommend statin if elevated (given underlying RA)     Glory Cordon, DO

## 2024-04-25 ENCOUNTER — LAB (OUTPATIENT)
Dept: LAB | Facility: LAB | Age: 69
End: 2024-04-25
Payer: MEDICARE

## 2024-04-25 DIAGNOSIS — I10 PRIMARY HYPERTENSION: ICD-10-CM

## 2024-04-25 DIAGNOSIS — M06.09 RHEUMATOID ARTHRITIS OF MULTIPLE SITES WITH NEGATIVE RHEUMATOID FACTOR (MULTI): ICD-10-CM

## 2024-04-25 DIAGNOSIS — M81.0 OSTEOPOROSIS, UNSPECIFIED OSTEOPOROSIS TYPE, UNSPECIFIED PATHOLOGICAL FRACTURE PRESENCE: ICD-10-CM

## 2024-04-25 DIAGNOSIS — M05.79 RHEUMATOID ARTHRITIS INVOLVING MULTIPLE SITES WITH POSITIVE RHEUMATOID FACTOR (MULTI): ICD-10-CM

## 2024-04-25 DIAGNOSIS — E78.5 HYPERLIPIDEMIA, UNSPECIFIED HYPERLIPIDEMIA TYPE: ICD-10-CM

## 2024-04-25 DIAGNOSIS — M06.09 RHEUMATOID ARTHRITIS OF MULTIPLE SITES WITH NEGATIVE RHEUMATOID FACTOR (MULTI): Primary | ICD-10-CM

## 2024-04-25 LAB
ALBUMIN SERPL BCP-MCNC: 4 G/DL (ref 3.4–5)
ALP SERPL-CCNC: 47 U/L (ref 33–136)
ALT SERPL W P-5'-P-CCNC: 13 U/L (ref 7–45)
ANION GAP SERPL CALC-SCNC: 11 MMOL/L (ref 10–20)
AST SERPL W P-5'-P-CCNC: 19 U/L (ref 9–39)
BILIRUB SERPL-MCNC: 0.6 MG/DL (ref 0–1.2)
BUN SERPL-MCNC: 15 MG/DL (ref 6–23)
CALCIUM SERPL-MCNC: 9.4 MG/DL (ref 8.6–10.6)
CHLORIDE SERPL-SCNC: 101 MMOL/L (ref 98–107)
CHOLEST SERPL-MCNC: 162 MG/DL (ref 0–199)
CHOLESTEROL/HDL RATIO: 2.6
CO2 SERPL-SCNC: 30 MMOL/L (ref 21–32)
CREAT SERPL-MCNC: 0.72 MG/DL (ref 0.5–1.05)
EGFRCR SERPLBLD CKD-EPI 2021: >90 ML/MIN/1.73M*2
ERYTHROCYTE [DISTWIDTH] IN BLOOD BY AUTOMATED COUNT: 13.1 % (ref 11.5–14.5)
GLUCOSE SERPL-MCNC: 89 MG/DL (ref 74–99)
HCT VFR BLD AUTO: 36.3 % (ref 36–46)
HDLC SERPL-MCNC: 63 MG/DL
HGB BLD-MCNC: 12.2 G/DL (ref 12–16)
LDLC SERPL CALC-MCNC: 84 MG/DL
MCH RBC QN AUTO: 32.7 PG (ref 26–34)
MCHC RBC AUTO-ENTMCNC: 33.6 G/DL (ref 32–36)
MCV RBC AUTO: 97 FL (ref 80–100)
NON HDL CHOLESTEROL: 99 MG/DL (ref 0–149)
NRBC BLD-RTO: 0 /100 WBCS (ref 0–0)
PLATELET # BLD AUTO: 235 X10*3/UL (ref 150–450)
POTASSIUM SERPL-SCNC: 4 MMOL/L (ref 3.5–5.3)
PROT SERPL-MCNC: 5.9 G/DL (ref 6.4–8.2)
RBC # BLD AUTO: 3.73 X10*6/UL (ref 4–5.2)
SODIUM SERPL-SCNC: 138 MMOL/L (ref 136–145)
TRIGL SERPL-MCNC: 73 MG/DL (ref 0–149)
VIT B12 SERPL-MCNC: 548 PG/ML (ref 211–911)
VLDL: 15 MG/DL (ref 0–40)
WBC # BLD AUTO: 5.6 X10*3/UL (ref 4.4–11.3)

## 2024-04-25 PROCEDURE — 82523 COLLAGEN CROSSLINKS: CPT

## 2024-04-25 PROCEDURE — 80053 COMPREHEN METABOLIC PANEL: CPT

## 2024-04-25 PROCEDURE — 82172 ASSAY OF APOLIPOPROTEIN: CPT | Performed by: SPECIALIST

## 2024-04-25 PROCEDURE — 82607 VITAMIN B-12: CPT

## 2024-04-25 PROCEDURE — 80061 LIPID PANEL: CPT

## 2024-04-25 PROCEDURE — 36415 COLL VENOUS BLD VENIPUNCTURE: CPT

## 2024-04-25 PROCEDURE — 85027 COMPLETE CBC AUTOMATED: CPT

## 2024-04-25 RX ORDER — METHOTREXATE 2.5 MG/1
20 TABLET ORAL
Qty: 40 TABLET | Refills: 6 | Status: SHIPPED | OUTPATIENT
Start: 2024-04-28 | End: 2024-04-30 | Stop reason: SDUPTHER

## 2024-04-25 RX ORDER — HYDROXYCHLOROQUINE SULFATE 200 MG/1
200 TABLET, FILM COATED ORAL 2 TIMES DAILY
Qty: 120 TABLET | Refills: 3 | Status: SHIPPED | OUTPATIENT
Start: 2024-04-25

## 2024-04-25 RX ORDER — FOLIC ACID 1 MG/1
1 TABLET ORAL DAILY
Qty: 90 TABLET | Refills: 3 | Status: SHIPPED | OUTPATIENT
Start: 2024-04-25

## 2024-04-26 LAB — COLLAGEN CTX SERPL-MCNC: 194 PG/ML

## 2024-04-27 LAB — LPA SERPL-MCNC: 35 MG/DL

## 2024-04-30 DIAGNOSIS — M06.09 RHEUMATOID ARTHRITIS OF MULTIPLE SITES WITH NEGATIVE RHEUMATOID FACTOR (MULTI): ICD-10-CM

## 2024-04-30 RX ORDER — METHOTREXATE 2.5 MG/1
20 TABLET ORAL
Qty: 103 TABLET | Refills: 3 | Status: SHIPPED | OUTPATIENT
Start: 2024-04-30

## 2024-05-16 DIAGNOSIS — I10 PRIMARY HYPERTENSION: Primary | ICD-10-CM

## 2024-05-16 RX ORDER — LOSARTAN POTASSIUM 25 MG/1
25 TABLET ORAL DAILY
Qty: 100 TABLET | Refills: 1 | Status: SHIPPED | OUTPATIENT
Start: 2024-05-16 | End: 2025-06-20

## 2024-07-26 DIAGNOSIS — I10 PRIMARY HYPERTENSION: Primary | ICD-10-CM

## 2024-07-26 RX ORDER — LOSARTAN POTASSIUM 50 MG/1
50 TABLET ORAL DAILY
Qty: 90 TABLET | Refills: 0 | Status: SHIPPED | OUTPATIENT
Start: 2024-07-26 | End: 2025-07-26

## 2024-08-09 ENCOUNTER — LAB (OUTPATIENT)
Dept: LAB | Facility: LAB | Age: 69
End: 2024-08-09
Payer: MEDICARE

## 2024-08-09 DIAGNOSIS — I10 PRIMARY HYPERTENSION: ICD-10-CM

## 2024-08-09 LAB
ANION GAP SERPL CALC-SCNC: 10 MMOL/L (ref 10–20)
BUN SERPL-MCNC: 20 MG/DL (ref 6–23)
CALCIUM SERPL-MCNC: 9.7 MG/DL (ref 8.6–10.6)
CHLORIDE SERPL-SCNC: 99 MMOL/L (ref 98–107)
CO2 SERPL-SCNC: 30 MMOL/L (ref 21–32)
CREAT SERPL-MCNC: 0.78 MG/DL (ref 0.5–1.05)
EGFRCR SERPLBLD CKD-EPI 2021: 83 ML/MIN/1.73M*2
GLUCOSE SERPL-MCNC: 84 MG/DL (ref 74–99)
POTASSIUM SERPL-SCNC: 4.3 MMOL/L (ref 3.5–5.3)
SODIUM SERPL-SCNC: 135 MMOL/L (ref 136–145)

## 2024-08-09 PROCEDURE — 36415 COLL VENOUS BLD VENIPUNCTURE: CPT

## 2024-08-09 PROCEDURE — 80048 BASIC METABOLIC PNL TOTAL CA: CPT

## 2024-10-24 DIAGNOSIS — I10 PRIMARY HYPERTENSION: ICD-10-CM

## 2024-10-24 RX ORDER — LOSARTAN POTASSIUM 50 MG/1
50 TABLET ORAL DAILY
Qty: 90 TABLET | Refills: 0 | Status: SHIPPED | OUTPATIENT
Start: 2024-10-24

## 2024-10-25 ENCOUNTER — APPOINTMENT (OUTPATIENT)
Dept: PRIMARY CARE | Facility: CLINIC | Age: 69
End: 2024-10-25
Payer: MEDICARE

## 2024-12-20 ENCOUNTER — APPOINTMENT (OUTPATIENT)
Dept: PRIMARY CARE | Facility: CLINIC | Age: 69
End: 2024-12-20
Payer: MEDICARE

## 2024-12-20 VITALS
HEIGHT: 68 IN | DIASTOLIC BLOOD PRESSURE: 80 MMHG | WEIGHT: 139 LBS | SYSTOLIC BLOOD PRESSURE: 152 MMHG | HEART RATE: 81 BPM | BODY MASS INDEX: 21.07 KG/M2 | OXYGEN SATURATION: 98 %

## 2024-12-20 DIAGNOSIS — R92.333 HETEROGENEOUSLY DENSE TISSUE OF BOTH BREASTS ON MAMMOGRAPHY: ICD-10-CM

## 2024-12-20 DIAGNOSIS — R91.8 LUNG NODULES: ICD-10-CM

## 2024-12-20 DIAGNOSIS — Z12.31 ENCOUNTER FOR SCREENING MAMMOGRAM FOR MALIGNANT NEOPLASM OF BREAST: ICD-10-CM

## 2024-12-20 DIAGNOSIS — M81.0 OSTEOPOROSIS, UNSPECIFIED OSTEOPOROSIS TYPE, UNSPECIFIED PATHOLOGICAL FRACTURE PRESENCE: ICD-10-CM

## 2024-12-20 DIAGNOSIS — Z00.00 MEDICARE ANNUAL WELLNESS VISIT, SUBSEQUENT: Primary | ICD-10-CM

## 2024-12-20 DIAGNOSIS — R29.6 FALLS: ICD-10-CM

## 2024-12-20 DIAGNOSIS — E78.5 HYPERLIPIDEMIA, UNSPECIFIED HYPERLIPIDEMIA TYPE: ICD-10-CM

## 2024-12-20 DIAGNOSIS — M06.09 RHEUMATOID ARTHRITIS OF MULTIPLE SITES WITH NEGATIVE RHEUMATOID FACTOR (MULTI): ICD-10-CM

## 2024-12-20 DIAGNOSIS — Z86.000 HISTORY OF LOBULAR CARCINOMA IN SITU (LCIS) OF BREAST: ICD-10-CM

## 2024-12-20 DIAGNOSIS — I10 PRIMARY HYPERTENSION: ICD-10-CM

## 2024-12-20 PROCEDURE — 1123F ACP DISCUSS/DSCN MKR DOCD: CPT | Performed by: SPECIALIST

## 2024-12-20 PROCEDURE — 1170F FXNL STATUS ASSESSED: CPT | Performed by: SPECIALIST

## 2024-12-20 PROCEDURE — 1036F TOBACCO NON-USER: CPT | Performed by: SPECIALIST

## 2024-12-20 PROCEDURE — 3079F DIAST BP 80-89 MM HG: CPT | Performed by: SPECIALIST

## 2024-12-20 PROCEDURE — 1126F AMNT PAIN NOTED NONE PRSNT: CPT | Performed by: SPECIALIST

## 2024-12-20 PROCEDURE — 3077F SYST BP >= 140 MM HG: CPT | Performed by: SPECIALIST

## 2024-12-20 PROCEDURE — 3008F BODY MASS INDEX DOCD: CPT | Performed by: SPECIALIST

## 2024-12-20 PROCEDURE — 1158F ADVNC CARE PLAN TLK DOCD: CPT | Performed by: SPECIALIST

## 2024-12-20 PROCEDURE — G0439 PPPS, SUBSEQ VISIT: HCPCS | Performed by: SPECIALIST

## 2024-12-20 PROCEDURE — 1159F MED LIST DOCD IN RCRD: CPT | Performed by: SPECIALIST

## 2024-12-20 ASSESSMENT — ACTIVITIES OF DAILY LIVING (ADL)
DOING_HOUSEWORK: INDEPENDENT
MANAGING_FINANCES: INDEPENDENT
TAKING_MEDICATION: INDEPENDENT
GROCERY_SHOPPING: INDEPENDENT
DRESSING: INDEPENDENT
BATHING: INDEPENDENT

## 2024-12-20 ASSESSMENT — PAIN SCALES - GENERAL: PAINLEVEL_OUTOF10: 0-NO PAIN

## 2024-12-20 ASSESSMENT — ENCOUNTER SYMPTOMS
OCCASIONAL FEELINGS OF UNSTEADINESS: 0
LOSS OF SENSATION IN FEET: 0
DEPRESSION: 0

## 2024-12-20 ASSESSMENT — PATIENT HEALTH QUESTIONNAIRE - PHQ9
SUM OF ALL RESPONSES TO PHQ9 QUESTIONS 1 AND 2: 0
2. FEELING DOWN, DEPRESSED OR HOPELESS: NOT AT ALL
1. LITTLE INTEREST OR PLEASURE IN DOING THINGS: NOT AT ALL

## 2024-12-20 NOTE — PROGRESS NOTES
"Subjective   Patient ID: Peyton Alberto \"Sunita\" is a 69 y.o. female who presents for Annual Exam.  HPI    67 yo female Pmhx sig for Seronegative RA, Htn, Dense Breast Tissue, Hx of LCIS (completed 5 yrs Tamoxifen), Osteoporosis (on reclast), Pulm Nodule, MDD presents for MAW    On plaquenil 5 days per week  RA controlled  Feels well     Had fall walking up incline on soft ground and feel back wards, hit head had concussion.  Fell in ditch got up and didn't recall falling, had CT head.  Also fell going up steps    Hikes, birding    Has Living will [facility] Care POA   right now is listed as POA    Goals Goals of Care:  Treat treatable conditions, but would not want to prolong the act of dying through extraordinary means if no hope for meaningful recovery.    Allergies   Allergen Reactions    Fluorescein-Benoxinate Other     keratitis    Lisinopril Other     cough    Sulfa (Sulfonamide Antibiotics) Rash      Current Outpatient Medications   Medication Instructions    calcium carbonate 600 mg calcium (1,500 mg) tablet 1 tablet, Daily    cholecalciferol (Vitamin D-3) 25 MCG (1000 UT) tablet 1 tablet, Daily    FLUoxetine (PROZAC) 20 mg, oral, Daily    folic acid (FOLVITE) 1 mg, oral, Daily    hydroxychloroquine (PLAQUENIL) 200 mg, oral, 2 times daily, TWICE A DAY 5 DAYS A WEEK    losartan (COZAAR) 50 mg, oral, Daily    methotrexate (TREXALL) 20 mg, oral, Once Weekly, TAKE 8 TABLETS ONCE A WEEK    traZODone (Desyrel) 50 mg tablet TAKE 1 TABLET AT BEDTIME AS NEEDED    zoledronic acid (Reclast) 5 mg/100 mL piggyback 5 mg        Review of Systems  Constitutional  No fatigue, no fevers, no chills, no unintentional weight loss,   HEENT:  Occasional morning headaches, occasionally snores quietly, no dizziness, eye exams curtrent, a little double vision from close work, no blurred vision,  a little hearing loss  Cardiovascular:  No chest pain, no palpitations, no shortness of breath with exertion (one flight of stairs), " "  Respiratory:  No cough, no hemoptysis, no wheezing, No shortness of breath at rest  GI:  No dysphagia, no odynophagia, no reflux, no abdominal pain, no nausea, no vomiting, no changes in bowel habits, no bright red blood per rectum, no melena  :  No urinary frequency, no dysuria, occasional urge urine incontinence  MSK:  2 falls, managed joint pain, a little ankle edema using compression hose (Left more than right), some varicose veins  Neuro:  No tremors, no extremity weakness, no changes in sensation  Breasts:  No abnormalities on self breast exams    Physical Exam  /80   Pulse 81   Ht 1.727 m (5' 8\")   Wt 63 kg (139 lb)   SpO2 98%   BMI 21.13 kg/m²   152/80  General:    Well-appearing F in no acute distress, well nourished, well hydrated  Head:  Normocephalic, atraumatic  Skin:          Warm dry  Eyes:  Anicteric sclera, pupils equal,   Ears:        TMs intact  Oral:      Not examined due to pandemic  Neck:   Supple, no cervical/supraclavicular adenopathy, no thyromegaly or nodules appreciated on exam  Cor:      Regular rate, normal S1, S2, no murmurs appreciated, no S3, no S4   Lungs:   Clear to auscultation b/l, no wheezes, no rhonchi, no crackles, no accessory respiratory muscle use  Abd:          Soft, nontender, no guarding, no rebound, no hepatosplenomegaly appreciated   Ext:            No lower extremity edema, no palpable cords  Pulses:      Pedal pulses intact  Neuro:   CN2-12 grossly intact (except funduscopic exam not performed and visual fields not examined)   Breasts:     No Axillary adenopathy, no discrete palpable breast nodules, no overlying skin changes, no nipple discharge    Assessment/Plan   MAW  Previously received annual influenza vaccine and Covid vaccine 9/9/2024   Prior RSV 2/2024  Prevnar ?(Likely 13) in 7/20/2016 (but Prevnar 16 was documented in [employer] which I suspect was in error) and PPSV23 4/2021, recommend in 4/2026 Prevnar 21 or 20  Previously received both " Shingrix vaccines  Prior Tdap 5/16/2016 repeat 10 yrs  DXA 3/25/2024 Osteoporosis repeat 2 yrs  Mammogram 12/26/2023, dense breast tissue, ordered  Prior Hysterectomy  Colonoscopy 7/7/2021 repeat 7 yrs (Hx of dysplastic flat polyp 2 cm at age 50 removed in [facility])  CT Cardiac Score 0 in 8/2013  Plans annual hearing test  Labs ordered CMP CBC Fx Lipids    Screening Breast Cancer, Dense breast tissue on Mammogram, Personal history of LCIS  Personal hx of Left Breast LCIS with Pagetoid extension in ducts in 5/2008 (s/p excisional biopsy 2008), s/p tamoxifen [id number])  Prior breast biopsies 2 open 1 or 2 needle biopsies  Prior Breast Surgery Eval 10/22/2018, FAST MRI had been ordered   Does self breast exams  Breast exam done today  Ashkenazi ancestry  Mammogram 12/26/2023, ordered    HTN  Home BP running 126/78   /80 today   Cough on lisionpril  Tolerating losartan 50 mg daily  Recommend home BP monitoring  Follow-up with home BP cuff   Will send [employer] message about possible BP dates (avoid Friday morning)    Hyperlipidemia  Elevated Lp(a) at 35,LDL 84  Recalculated ASCVD risk today using last year's lipid profile at 12.9  CT Cardiac Score 0 in 8/2013  Labs ordered CMP Fx lipids    RA  On methotrexate and Plaquenil  Management per Rheum  Labs ordered CMP    Osteoporosis  DXA 3/25/2024 Osteoporosis on Reclast  Management per Rheum    Pulm Nodules  CT Chest 5/2015, stable over 27 months suggests benign, no further follow-up recommended    Falls  Placed referral to PT, plans to go to [facility]       Glory Cordon DO

## 2024-12-31 PROBLEM — Z00.00 MEDICARE ANNUAL WELLNESS VISIT, SUBSEQUENT: Status: ACTIVE | Noted: 2024-12-31

## 2024-12-31 PROBLEM — R92.8 ABNORMAL FINDING ON MAMMOGRAPHY: Status: RESOLVED | Noted: 2023-03-06 | Resolved: 2024-12-31
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
## 2024-12-31 PROBLEM — Z86.000 HISTORY OF LOBULAR CARCINOMA IN SITU (LCIS) OF BREAST: Status: ACTIVE | Noted: 2024-12-31

## 2024-12-31 PROBLEM — R29.6 FALLS: Status: ACTIVE | Noted: 2024-12-31

## 2024-12-31 PROBLEM — Z12.31 ENCOUNTER FOR SCREENING MAMMOGRAM FOR MALIGNANT NEOPLASM OF BREAST: Status: ACTIVE | Noted: 2024-12-31

## 2025-01-09 DIAGNOSIS — I10 PRIMARY HYPERTENSION: Primary | ICD-10-CM

## 2025-01-09 RX ORDER — LOSARTAN POTASSIUM 100 MG/1
100 TABLET ORAL DAILY
Qty: 100 TABLET | Refills: 3 | Status: SHIPPED | OUTPATIENT
Start: 2025-01-09 | End: 2026-02-13

## 2025-01-15 ENCOUNTER — APPOINTMENT (OUTPATIENT)
Dept: PRIMARY CARE | Facility: CLINIC | Age: 70
End: 2025-01-15
Payer: MEDICARE

## 2025-02-02 DIAGNOSIS — I10 PRIMARY HYPERTENSION: ICD-10-CM

## 2025-02-02 DIAGNOSIS — M06.09 RHEUMATOID ARTHRITIS OF MULTIPLE SITES WITH NEGATIVE RHEUMATOID FACTOR (MULTI): ICD-10-CM

## 2025-02-02 DIAGNOSIS — G47.9 SLEEP DISTURBANCE: ICD-10-CM

## 2025-02-02 RX ORDER — METHOTREXATE 2.5 MG/1
TABLET ORAL
Refills: 0 | OUTPATIENT
Start: 2025-02-02

## 2025-02-02 RX ORDER — FLUOXETINE HYDROCHLORIDE 20 MG/1
20 CAPSULE ORAL DAILY
Qty: 90 CAPSULE | Refills: 3 | Status: SHIPPED | OUTPATIENT
Start: 2025-02-02

## 2025-02-02 RX ORDER — LOSARTAN POTASSIUM 100 MG/1
100 TABLET ORAL DAILY
Qty: 90 TABLET | Refills: 3 | Status: SHIPPED | OUTPATIENT
Start: 2025-02-02

## 2025-02-02 RX ORDER — TRAZODONE HYDROCHLORIDE 50 MG/1
50 TABLET ORAL NIGHTLY PRN
Qty: 90 TABLET | Refills: 3 | Status: SHIPPED | OUTPATIENT
Start: 2025-02-02

## 2025-02-02 RX ORDER — HYDROXYCHLOROQUINE SULFATE 200 MG/1
TABLET, FILM COATED ORAL
Refills: 0 | OUTPATIENT
Start: 2025-02-02

## 2025-02-03 DIAGNOSIS — M06.09 RHEUMATOID ARTHRITIS OF MULTIPLE SITES WITH NEGATIVE RHEUMATOID FACTOR (MULTI): Primary | ICD-10-CM

## 2025-02-03 DIAGNOSIS — M81.0 OSTEOPOROSIS, UNSPECIFIED OSTEOPOROSIS TYPE, UNSPECIFIED PATHOLOGICAL FRACTURE PRESENCE: ICD-10-CM

## 2025-02-05 LAB
25(OH)D3+25(OH)D2 SERPL-MCNC: 61 NG/ML (ref 30–100)
ALBUMIN SERPL-MCNC: 4.4 G/DL (ref 3.6–5.1)
ALBUMIN SERPL-MCNC: 4.5 G/DL (ref 3.6–5.1)
ALP SERPL-CCNC: 45 U/L (ref 37–153)
ALP SERPL-CCNC: 47 U/L (ref 37–153)
ALT SERPL-CCNC: 12 U/L (ref 6–29)
ALT SERPL-CCNC: 15 U/L (ref 6–29)
ANION GAP SERPL CALCULATED.4IONS-SCNC: 8 MMOL/L (CALC) (ref 7–17)
ANION GAP SERPL CALCULATED.4IONS-SCNC: 8 MMOL/L (CALC) (ref 7–17)
AST SERPL-CCNC: 24 U/L (ref 10–35)
AST SERPL-CCNC: 24 U/L (ref 10–35)
BASOPHILS # BLD AUTO: 10 CELLS/UL (ref 0–200)
BASOPHILS NFR BLD AUTO: 0.2 %
BILIRUB SERPL-MCNC: 0.8 MG/DL (ref 0.2–1.2)
BILIRUB SERPL-MCNC: 0.8 MG/DL (ref 0.2–1.2)
BUN SERPL-MCNC: 15 MG/DL (ref 7–25)
BUN SERPL-MCNC: 15 MG/DL (ref 7–25)
CALCIUM SERPL-MCNC: 9.3 MG/DL (ref 8.6–10.4)
CALCIUM SERPL-MCNC: 9.5 MG/DL (ref 8.6–10.4)
CHLORIDE SERPL-SCNC: 101 MMOL/L (ref 98–110)
CHLORIDE SERPL-SCNC: 102 MMOL/L (ref 98–110)
CHOLEST SERPL-MCNC: 190 MG/DL
CHOLEST/HDLC SERPL: 2.5 (CALC)
CO2 SERPL-SCNC: 28 MMOL/L (ref 20–32)
CO2 SERPL-SCNC: 28 MMOL/L (ref 20–32)
COLLAGEN CTX SERPL-MCNC: NORMAL PG/ML
CREAT SERPL-MCNC: 0.72 MG/DL (ref 0.5–1.05)
CREAT SERPL-MCNC: 0.74 MG/DL (ref 0.5–1.05)
CRP SERPL-MCNC: <3 MG/L
EGFRCR SERPLBLD CKD-EPI 2021: 88 ML/MIN/1.73M2
EGFRCR SERPLBLD CKD-EPI 2021: 90 ML/MIN/1.73M2
EOSINOPHIL # BLD AUTO: 93 CELLS/UL (ref 15–500)
EOSINOPHIL NFR BLD AUTO: 1.9 %
ERYTHROCYTE [DISTWIDTH] IN BLOOD BY AUTOMATED COUNT: 12.4 % (ref 11–15)
ERYTHROCYTE [DISTWIDTH] IN BLOOD BY AUTOMATED COUNT: 12.4 % (ref 11–15)
ERYTHROCYTE [SEDIMENTATION RATE] IN BLOOD BY WESTERGREN METHOD: 2 MM/H
GLUCOSE SERPL-MCNC: 84 MG/DL (ref 65–99)
GLUCOSE SERPL-MCNC: 84 MG/DL (ref 65–99)
HCT VFR BLD AUTO: 38.2 % (ref 35–45)
HCT VFR BLD AUTO: 38.5 % (ref 35–45)
HDLC SERPL-MCNC: 75 MG/DL
HGB BLD-MCNC: 12.6 G/DL (ref 11.7–15.5)
HGB BLD-MCNC: 12.6 G/DL (ref 11.7–15.5)
LDLC SERPL CALC-MCNC: 101 MG/DL (CALC)
LYMPHOCYTES # BLD AUTO: 1666 CELLS/UL (ref 850–3900)
LYMPHOCYTES NFR BLD AUTO: 34 %
MCH RBC QN AUTO: 32.6 PG (ref 27–33)
MCH RBC QN AUTO: 32.9 PG (ref 27–33)
MCHC RBC AUTO-ENTMCNC: 32.7 G/DL (ref 32–36)
MCHC RBC AUTO-ENTMCNC: 33 G/DL (ref 32–36)
MCV RBC AUTO: 99.7 FL (ref 80–100)
MCV RBC AUTO: 99.7 FL (ref 80–100)
MONOCYTES # BLD AUTO: 304 CELLS/UL (ref 200–950)
MONOCYTES NFR BLD AUTO: 6.2 %
NEUTROPHILS # BLD AUTO: 2827 CELLS/UL (ref 1500–7800)
NEUTROPHILS NFR BLD AUTO: 57.7 %
NONHDLC SERPL-MCNC: 115 MG/DL (CALC)
PLATELET # BLD AUTO: 228 THOUSAND/UL (ref 140–400)
PLATELET # BLD AUTO: 231 THOUSAND/UL (ref 140–400)
PMV BLD REES-ECKER: 10.3 FL (ref 7.5–12.5)
PMV BLD REES-ECKER: 10.3 FL (ref 7.5–12.5)
POTASSIUM SERPL-SCNC: 4.4 MMOL/L (ref 3.5–5.3)
POTASSIUM SERPL-SCNC: 4.4 MMOL/L (ref 3.5–5.3)
PROT SERPL-MCNC: 6.2 G/DL (ref 6.1–8.1)
PROT SERPL-MCNC: 6.3 G/DL (ref 6.1–8.1)
RBC # BLD AUTO: 3.83 MILLION/UL (ref 3.8–5.1)
RBC # BLD AUTO: 3.86 MILLION/UL (ref 3.8–5.1)
SODIUM SERPL-SCNC: 137 MMOL/L (ref 135–146)
SODIUM SERPL-SCNC: 138 MMOL/L (ref 135–146)
TRIGL SERPL-MCNC: 55 MG/DL
WBC # BLD AUTO: 4.9 THOUSAND/UL (ref 3.8–10.8)
WBC # BLD AUTO: 5 THOUSAND/UL (ref 3.8–10.8)

## 2025-02-07 ENCOUNTER — HOSPITAL ENCOUNTER (OUTPATIENT)
Dept: RADIOLOGY | Facility: HOSPITAL | Age: 70
Discharge: HOME | End: 2025-02-07
Payer: MEDICARE

## 2025-02-07 DIAGNOSIS — R92.333 HETEROGENEOUSLY DENSE TISSUE OF BOTH BREASTS ON MAMMOGRAPHY: ICD-10-CM

## 2025-02-07 DIAGNOSIS — Z12.31 ENCOUNTER FOR SCREENING MAMMOGRAM FOR MALIGNANT NEOPLASM OF BREAST: ICD-10-CM

## 2025-02-07 PROCEDURE — 77063 BREAST TOMOSYNTHESIS BI: CPT

## 2025-02-10 LAB
25(OH)D3+25(OH)D2 SERPL-MCNC: 61 NG/ML (ref 30–100)
ALBUMIN SERPL-MCNC: 4.4 G/DL (ref 3.6–5.1)
ALP SERPL-CCNC: 45 U/L (ref 37–153)
ALT SERPL-CCNC: 12 U/L (ref 6–29)
ANION GAP SERPL CALCULATED.4IONS-SCNC: 8 MMOL/L (CALC) (ref 7–17)
AST SERPL-CCNC: 24 U/L (ref 10–35)
BASOPHILS # BLD AUTO: 10 CELLS/UL (ref 0–200)
BASOPHILS NFR BLD AUTO: 0.2 %
BILIRUB SERPL-MCNC: 0.8 MG/DL (ref 0.2–1.2)
BUN SERPL-MCNC: 15 MG/DL (ref 7–25)
CALCIUM SERPL-MCNC: 9.3 MG/DL (ref 8.6–10.4)
CHLORIDE SERPL-SCNC: 102 MMOL/L (ref 98–110)
CO2 SERPL-SCNC: 28 MMOL/L (ref 20–32)
COLLAGEN CTX SERPL-MCNC: 196 PG/ML
CREAT SERPL-MCNC: 0.72 MG/DL (ref 0.5–1.05)
CRP SERPL-MCNC: <3 MG/L
EGFRCR SERPLBLD CKD-EPI 2021: 90 ML/MIN/1.73M2
EOSINOPHIL # BLD AUTO: 93 CELLS/UL (ref 15–500)
EOSINOPHIL NFR BLD AUTO: 1.9 %
ERYTHROCYTE [DISTWIDTH] IN BLOOD BY AUTOMATED COUNT: 12.4 % (ref 11–15)
ERYTHROCYTE [SEDIMENTATION RATE] IN BLOOD BY WESTERGREN METHOD: 2 MM/H
GLUCOSE SERPL-MCNC: 84 MG/DL (ref 65–99)
HCT VFR BLD AUTO: 38.5 % (ref 35–45)
HGB BLD-MCNC: 12.6 G/DL (ref 11.7–15.5)
LYMPHOCYTES # BLD AUTO: 1666 CELLS/UL (ref 850–3900)
LYMPHOCYTES NFR BLD AUTO: 34 %
MCH RBC QN AUTO: 32.6 PG (ref 27–33)
MCHC RBC AUTO-ENTMCNC: 32.7 G/DL (ref 32–36)
MCV RBC AUTO: 99.7 FL (ref 80–100)
MONOCYTES # BLD AUTO: 304 CELLS/UL (ref 200–950)
MONOCYTES NFR BLD AUTO: 6.2 %
NEUTROPHILS # BLD AUTO: 2827 CELLS/UL (ref 1500–7800)
NEUTROPHILS NFR BLD AUTO: 57.7 %
PLATELET # BLD AUTO: 231 THOUSAND/UL (ref 140–400)
PMV BLD REES-ECKER: 10.3 FL (ref 7.5–12.5)
POTASSIUM SERPL-SCNC: 4.4 MMOL/L (ref 3.5–5.3)
PROT SERPL-MCNC: 6.2 G/DL (ref 6.1–8.1)
RBC # BLD AUTO: 3.86 MILLION/UL (ref 3.8–5.1)
SODIUM SERPL-SCNC: 138 MMOL/L (ref 135–146)
WBC # BLD AUTO: 4.9 THOUSAND/UL (ref 3.8–10.8)

## 2025-02-12 DIAGNOSIS — M06.09 RHEUMATOID ARTHRITIS OF MULTIPLE SITES WITH NEGATIVE RHEUMATOID FACTOR (MULTI): ICD-10-CM

## 2025-02-12 NOTE — TELEPHONE ENCOUNTER
Prescription Request (Pharmacy is requesting a 90 day supply with refills.)        Has The Patient Been Identified By Name And Date Of Birth:Yes    RX Requestor:Pharmacy     Date of Last Refill:04/25/24    Date Of Last Office Visit:04/22/24    Date Of Future Office Visit: 04/02/25

## 2025-02-13 RX ORDER — HYDROXYCHLOROQUINE SULFATE 200 MG/1
200 TABLET, FILM COATED ORAL 2 TIMES DAILY
Qty: 120 TABLET | Refills: 3 | Status: SHIPPED | OUTPATIENT
Start: 2025-02-13

## 2025-02-14 DIAGNOSIS — M06.09 RHEUMATOID ARTHRITIS OF MULTIPLE SITES WITH NEGATIVE RHEUMATOID FACTOR (MULTI): ICD-10-CM

## 2025-02-14 RX ORDER — METHOTREXATE 2.5 MG/1
20 TABLET ORAL
Qty: 103 TABLET | Refills: 3 | Status: SHIPPED | OUTPATIENT
Start: 2025-02-16

## 2025-03-13 DIAGNOSIS — M06.09 RHEUMATOID ARTHRITIS OF MULTIPLE SITES WITH NEGATIVE RHEUMATOID FACTOR (MULTI): ICD-10-CM

## 2025-03-13 RX ORDER — METHOTREXATE 2.5 MG/1
20 TABLET ORAL
Qty: 8 TABLET | Refills: 3 | Status: SHIPPED | OUTPATIENT
Start: 2025-03-16

## 2025-03-27 DIAGNOSIS — M06.09 RHEUMATOID ARTHRITIS OF MULTIPLE SITES WITH NEGATIVE RHEUMATOID FACTOR (MULTI): ICD-10-CM

## 2025-03-27 RX ORDER — METHOTREXATE 2.5 MG/1
20 TABLET ORAL
Qty: 8 TABLET | Refills: 3 | Status: SHIPPED | OUTPATIENT
Start: 2025-03-30

## 2025-03-27 RX ORDER — METHOTREXATE 2.5 MG/1
TABLET ORAL
Refills: 0 | OUTPATIENT
Start: 2025-03-27

## 2025-03-27 NOTE — TELEPHONE ENCOUNTER
Prescription Request    Methotrexate 2.5 mg    Has The Patient Been Identified By Name And Date Of Birth:    RX Requestor:    Date of Last Refill: 3/13/25    Date Of Last Office Visit:  4/22/24    Date Of Future Office Visit:  4/2 25    Using [facility]

## 2025-04-01 NOTE — PROGRESS NOTES
"Subjective   Patient ID: Peyton Alberto \"Sunita\" is a 69 y.o. female who presents for Follow-up (FUV- for Osteoporosis and RA with some tenderness down the side of thighs and pain in feet. ).    HPI Last seen one year ago 4-2024    seronegative, erosive rheumatoid arthritis affecting primarily her hands and feet  Osteoporosis    ACTONEL from 3433-8644  Reclast X 3 last year    ON MTX    VIT D 67  CTX 2021 201  CTX 2022 292   Latest Reference Range & Units 02/04/25 09:31   C TELOPEPTIDE (CTX) see note pg/mL 196   VITAMIN D,25-OH,TOTAL,IA 30 - 100 ng/mL 61    Started PT for balance       DEXA 3-2024    SPINE L1-L4  Bone Mineral Density: 0.771  T-Score -3.4  Z-Score -1.8      Bone Mineral Density change vs baseline:  Not reported  Bone Mineral Density change vs previous: -2.8%      LEFT FEMUR -TOTAL  Bone Mineral Density: 0.638  T-Score -2.9   Z-Score  -1.6      Bone Mineral Density change vs baseline: Not reported  Bone Mineral Density change vs previous: -1.7%.      LEFT FEMUR -NECK  Bone Mineral Density: 0.629  T-Score -2.9  Z-Score -1.3      Bone Mineral Density change vs baseline: Not reported  Bone Mineral Density change vs previous: Not reported      FRAX    10-year Fracture Risk:  Major Osteoporotic Fracture  20.4  Hip Fracture                        7.1    PAIN:  SWELLING:   AM GEL:  SIDE EFFECTS OF MED:    LAST LAB  Lab Results   Component Value Date    SEDRATE 2 02/04/2025    CRP <3.0 02/04/2025    TSH 1.52 03/24/2023   CBC CMP normal 2024 reviewed      PHYSICAL EXAM  NODES   HEART  LUNGS  ABDOMEN   VASCULAR  NEURO   SKIN  JOINTS   There is currently no information documented on the homunculus. Go to the Rheumatology activity and complete the homunculus joint exam.   Assessment/Plan   Diagnoses and all orders for this visit:  Osteoporosis, unspecified osteoporosis type, unspecified pathological fracture presence  Rheumatoid arthritis of multiple sites with negative rheumatoid factor (Multi)      Yearly " follow-up for both rheumatoid arthritis and osteoporosis    No major changes in the activity of rheumatoid arthritis    No significant falls or fractures excetra regarding osteoporosis  She had 3 consecutive years of Reclast ending in 2023  Review of DEXA scan sequentially and compared to 2022 revealed slight worsening of spine femur and femoral neck but at most less than 2%  Her FRAX score remains elevated    At this time she will continue with her methotrexate hydroxychloroquine    At this point I would not put her back on Reclast or other osteoporotic disease modifying drugs    CTX better 2025 improved     Major issue deformity feet from RA     Cont all med  No med tx for Osteoporosis needed at this time

## 2025-04-02 ENCOUNTER — APPOINTMENT (OUTPATIENT)
Dept: RHEUMATOLOGY | Facility: CLINIC | Age: 70
End: 2025-04-02
Payer: MEDICARE

## 2025-04-02 VITALS
SYSTOLIC BLOOD PRESSURE: 157 MMHG | BODY MASS INDEX: 21.13 KG/M2 | HEART RATE: 74 BPM | OXYGEN SATURATION: 100 % | WEIGHT: 139 LBS | DIASTOLIC BLOOD PRESSURE: 89 MMHG

## 2025-04-02 DIAGNOSIS — M81.0 OSTEOPOROSIS, UNSPECIFIED OSTEOPOROSIS TYPE, UNSPECIFIED PATHOLOGICAL FRACTURE PRESENCE: Primary | ICD-10-CM

## 2025-04-02 DIAGNOSIS — M06.09 RHEUMATOID ARTHRITIS OF MULTIPLE SITES WITH NEGATIVE RHEUMATOID FACTOR (MULTI): ICD-10-CM

## 2025-04-02 PROCEDURE — 3077F SYST BP >= 140 MM HG: CPT | Performed by: INTERNAL MEDICINE

## 2025-04-02 PROCEDURE — 3079F DIAST BP 80-89 MM HG: CPT | Performed by: INTERNAL MEDICINE

## 2025-04-02 PROCEDURE — 1159F MED LIST DOCD IN RCRD: CPT | Performed by: INTERNAL MEDICINE

## 2025-04-02 PROCEDURE — 99214 OFFICE O/P EST MOD 30 MIN: CPT | Performed by: INTERNAL MEDICINE

## 2025-04-02 RX ORDER — METHOTREXATE 2.5 MG/1
20 TABLET ORAL
Qty: 104 TABLET | Refills: 3 | Status: SHIPPED | OUTPATIENT
Start: 2025-04-06

## 2025-07-23 ENCOUNTER — PATIENT MESSAGE (OUTPATIENT)
Dept: RHEUMATOLOGY | Facility: CLINIC | Age: 70
End: 2025-07-23
Payer: MEDICARE

## 2025-07-23 ENCOUNTER — TELEMEDICINE (OUTPATIENT)
Dept: RHEUMATOLOGY | Facility: CLINIC | Age: 70
End: 2025-07-23
Payer: MEDICARE

## 2025-07-23 DIAGNOSIS — S00.06XA TICK BITE OF SCALP, INITIAL ENCOUNTER: Primary | ICD-10-CM
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
## 2025-07-23 RX ORDER — DOXYCYCLINE HYCLATE 100 MG
100 TABLET ORAL 2 TIMES DAILY
Qty: 20 TABLET | Refills: 0 | Status: SHIPPED | OUTPATIENT
Start: 2025-07-23 | End: 2025-08-02

## 2025-07-23 NOTE — TELEPHONE ENCOUNTER
Please see patient message below. Patient has called the office as well. She was bit by a tick. She's asking for an antibiotic. Please advise.

## 2025-07-23 NOTE — PROGRESS NOTES
"Subjective   Patient ID: Peyton Alberto \"Sunita\" is a 69 y.o. female who presents for Bite (Patient states she was bitten by a Tick. ).    Virtual urgent    Tick bite took off tick past Sunday in [facility]  No SXS  ---------------------------------------------------------------  HPI Last seen one year ago 4-2025    seronegative, erosive rheumatoid arthritis affecting primarily her hands and feet  Osteoporosis    ACTONEL from [id number]  Reclast X 3 last year    ON MTX    VIT D 67  CTX 2021 201  CTX 2022 292   Latest Reference Range & Units 02/04/25 09:31   C TELOPEPTIDE (CTX) see note pg/mL 196   VITAMIN D,25-OH,TOTAL,IA 30 - 100 ng/mL 61    Started PT for balance       DEXA 3-2024    SPINE L1-L4  Bone Mineral Density: 0.771  T-Score -3.4  Z-Score -1.8      Bone Mineral Density change vs baseline:  Not reported  Bone Mineral Density change vs previous: -2.8%      LEFT FEMUR -TOTAL  Bone Mineral Density: 0.638  T-Score -2.9   Z-Score  -1.6      Bone Mineral Density change vs baseline: Not reported  Bone Mineral Density change vs previous: -1.7%.      LEFT FEMUR -NECK  Bone Mineral Density: 0.629  T-Score -2.9  Z-Score -1.3      Bone Mineral Density change vs baseline: Not reported  Bone Mineral Density change vs previous: Not reported      FRAX    10-year Fracture Risk:  Major Osteoporotic Fracture  20.4  Hip Fracture                        7.1    PAIN:  SWELLING:   AM GEL:  SIDE EFFECTS OF MED:    LAST LAB  Lab Results   Component Value Date    SEDRATE 2 02/04/2025    CRP <3.0 02/04/2025    TSH 1.52 03/24/2023   CBC CMP normal 2024 reviewed      PHYSICAL EXAM  NODES   HEART  LUNGS  ABDOMEN   VASCULAR  NEURO   SKIN  JOINTS   There is currently no information documented on the homunculus. Go to the Rheumatology activity and complete the homunculus joint exam.   Assessment/Plan   There are no diagnoses linked to this encounter.    Today tick bite  3 days ago  RX doxycycline 100 mg BID for 10 days      4-2025 " visit   Yearly follow-up for both rheumatoid arthritis and osteoporosis    No major changes in the activity of rheumatoid arthritis    No significant falls or fractures excetra regarding osteoporosis  She had 3 consecutive years of Reclast ending in 2023  Review of DEXA scan sequentially and compared to 2022 revealed slight worsening of spine femur and femoral neck but at most less than 2%  Her FRAX score remains elevated    At this time she will continue with her methotrexate hydroxychloroquine    At this point I would not put her back on Reclast or other osteoporotic disease modifying drugs    CTX better 2025 improved     Major issue deformity feet from RA     Cont all med  No med tx for Osteoporosis needed at this time

## 2025-08-25 DIAGNOSIS — M05.79 RHEUMATOID ARTHRITIS INVOLVING MULTIPLE SITES WITH POSITIVE RHEUMATOID FACTOR (MULTI): ICD-10-CM

## 2025-08-25 DIAGNOSIS — M06.09 RHEUMATOID ARTHRITIS OF MULTIPLE SITES WITH NEGATIVE RHEUMATOID FACTOR (MULTI): ICD-10-CM

## 2025-08-25 RX ORDER — FOLIC ACID 1 MG/1
1 TABLET ORAL
Qty: 90 TABLET | Refills: 3 | Status: SHIPPED | OUTPATIENT
Start: 2025-08-25

## 2025-12-24 ENCOUNTER — APPOINTMENT (OUTPATIENT)
Dept: PRIMARY CARE | Facility: CLINIC | Age: 70
End: 2025-12-24
Payer: MEDICARE

## 2026-04-03 ENCOUNTER — APPOINTMENT (OUTPATIENT)
Dept: RHEUMATOLOGY | Facility: CLINIC | Age: 71
End: 2026-04-03
Payer: MEDICARE